# Patient Record
Sex: FEMALE | Race: WHITE | URBAN - METROPOLITAN AREA
[De-identification: names, ages, dates, MRNs, and addresses within clinical notes are randomized per-mention and may not be internally consistent; named-entity substitution may affect disease eponyms.]

---

## 2020-03-16 ENCOUNTER — TELEPHONE (OUTPATIENT)
Dept: CARDIOLOGY CLINIC | Age: 60
End: 2020-03-16

## 2020-03-16 NOTE — TELEPHONE ENCOUNTER
Referral per Dr. Jayant Cervantes for Dr. Emil Gallardo for [address] [facility] with PCI later this week,  + Flu A and on Tamiflu. Patient at [facility] today, will be sent home on Plavix, will complete a full week of Tamiflu (checked with ID). Possible C with PCI later this week, ? Friday. Per Dr. Emil Gallardo, patient will need to be called and checked on patient tomorrow. Check on her angina symptoms. Make sure compliant with Tamiflu. Updated Terrie-  to put on her radar. We discussed and will put on QGenda(done), orders placed and will e-mail her procedure sheet. No insurance information in patient's chart at this time. Will discuss with LAW office to see if they can facilitate--spoke with Nu on the phone. Insurance-- Chris. --Charley HIP (updated Shira 3/17/20)  Iodine allergy____  DC Summary_____

## 2020-03-17 NOTE — TELEPHONE ENCOUNTER
Called and spoke with Dr. Ab Duff, no new update for me since our conversation yesterday. Continue plan for Friday. Instructed him that I will reach back out to patient tomorrow to check in in on her and go over all information for procedure (Iodine Allergy?).

## 2020-03-17 NOTE — TELEPHONE ENCOUNTER
Called patient at [phone] (no HIPPA in chart).  answered and states she is still in the hospital [facility]) with hopes to be discharged today-education currently. States she started treatment with Tamiflu on Friday 3/13/20 per . Gave him my name, number and who I was calling with. Instructed him that I will touch base with the cardiologist to get fully updated and reach back out to them with further instructions. He vu at this time.

## 2020-03-18 NOTE — TELEPHONE ENCOUNTER
LEFT HEART CATHETERIZATION WITH POSSIBLE INTERVENTION WITH DR. HENAO AT [facility]   The morning of your procedure you will park in the hospital parking lot and report directly to the cath lab to check in. Come in via the discharge bridge. DATE: Friday 3/20/20  TIME: 0900  ARRIVAL TIME: 0730    Pre-Procedure Instructions   1. You will need to fast for at least 8 hours prior to procedure. 2. No caffeine the morning of.   3. All other medications can be taken in the morning with sips of water, including your Plavix and aspirin. 4. You will need to take 325 mg aspirin the morning of. If you are currently taking 81 mg please take 4 tablets that morning. 5. Do not use any lotions, creams or perfume the morning of procedure. 6. Pre-procedure lab work will need to be completed at [facility]  7. Please have a responsible adult to drive you home after procedure. We advise you have someone to stay with you for 24 hours following procedure for precautionary measures. Depending on procedure you may require an overnight stay. 8. Cath lab will provide you with all post procedure instructions and follow up. If you have any questions regarding the procedure itself or medications, please call [phone] and ask to speak with a nurse.

## 2020-03-19 NOTE — TELEPHONE ENCOUNTER
Patient's  called stating that he spoke with his insurance and they told him the procedure would NOT be covered. He would like to cancel his procedure and he will call a cardiologist in their network and have them request all needed records.

## 2020-03-19 NOTE — TELEPHONE ENCOUNTER
Called and spoke with Jeanelle Shone, patient's . We reviewed everything on our end and his end. They do match up. He has already called Dr. Nilesh Coronel office at [facility] as he is established in [place]. Waiting for his  to call him back. Instructed him to let us know anything they need within this transition. He vu and has no further question or concerns at this time.

## 2020-03-19 NOTE — PRE-PROCEDURE INSTRUCTIONS
Called patient about procedure in cath lab, instructed to arrive at  0730 for procedure at 0900. Patient should be NPO after midnight, but can take morning medication with sips of water. Instructed to have a responsible adult be with patient to take them home and stay with them afterwards. Patient asked to bring current list of medications. All questions and concerns addressed. In depth conversation with wife and  as they were not certain this was scheduled and still needed confirmation on insurance. I referred them to call their insurance carrier and transferred to [facility] as well. Also provided our phone number to call if they need additional help.

## 2020-03-19 NOTE — TELEPHONE ENCOUNTER
Email received from Yamil Arce that the procedure is a go for tomorrow. Will reach out to patient and .

## 2020-03-20 ENCOUNTER — HOSPITAL ENCOUNTER (OUTPATIENT)
Dept: CARDIAC CATH/INVASIVE PROCEDURES | Age: 60
Discharge: HOME | End: 2020-03-20
Payer: OTHER GOVERNMENT

## 2020-03-25 NOTE — PROGRESS NOTES
she has angina.  COPD (chronic obstructive pulmonary disease) ([facility] [address]     --Managed by Dr. Jake Billings. Using 02.       HLD (hyperlipidemia)     --LDL 78. Taking statin.  Ischemic cardiomyopathy     Echo 3/2020 LVEF 40-45%, grade I DD, mod AI, mild MR. Cath 3/2020 LVEF 55%, LVEDP 12mmHg. proBNP 1560 3/2020.  Tobacco use disorder. Quit smoking. Ongoing cessation discussed. *  GERD. Managed by PCP. Plan  Stable angina. No clinical evidence of CHF. Continue medical management of CAD. Will add Imdur 30mg daily for angina control. Advised to call if she has exertional angina, would then proceed with PCI. Ongoing risk factor modification discussed including tobacco cessation. Continue plavix, ASA, BB and statin. Fasting lipid profile, CMP before next visit. Return in about 3 months (around 7/1/2020). This note was scribed in the presence of the physician by Ji Raymond RN. The scribes documentation has been prepared under my direction and personally reviewed by me in its entirety. I confirm that the note above accurately reflects all work, treatment, procedures, and medical decision making performed by me.

## 2020-04-01 ENCOUNTER — OFFICE VISIT (OUTPATIENT)
Dept: CARDIOLOGY CLINIC | Age: 60
End: 2020-04-01
Payer: OTHER GOVERNMENT

## 2020-04-01 VITALS
BODY MASS INDEX: 20.3 KG/M2 | SYSTOLIC BLOOD PRESSURE: 136 MMHG | TEMPERATURE: 98.1 F | DIASTOLIC BLOOD PRESSURE: 84 MMHG | HEART RATE: 73 BPM | HEIGHT: 63 IN | WEIGHT: 114.6 LBS | OXYGEN SATURATION: 98 %

## 2020-04-01 PROCEDURE — 99215 OFFICE O/P EST HI 40 MIN: CPT | Performed by: INTERNAL MEDICINE

## 2020-04-01 RX ORDER — ISOSORBIDE MONONITRATE 30 MG/1
30 TABLET, EXTENDED RELEASE ORAL DAILY
Qty: 90 TABLET | Refills: 3 | Status: SHIPPED | OUTPATIENT
Start: 2020-04-01

## 2020-04-01 RX ORDER — CLOPIDOGREL BISULFATE 75 MG/1
75 TABLET ORAL DAILY
COMMUNITY
End: 2020-04-15 | Stop reason: SDUPTHER

## 2020-04-01 RX ORDER — OMEPRAZOLE 40 MG/1
40 CAPSULE, DELAYED RELEASE ORAL DAILY
COMMUNITY

## 2020-04-01 RX ORDER — METOPROLOL SUCCINATE 25 MG/1
25 TABLET, EXTENDED RELEASE ORAL DAILY
COMMUNITY
End: 2020-04-15 | Stop reason: SDUPTHER

## 2020-04-01 RX ORDER — BUTALBITAL, ACETAMINOPHEN AND CAFFEINE 300; 40; 50 MG/1; MG/1; MG/1
1 CAPSULE ORAL PRN
COMMUNITY

## 2020-04-01 RX ORDER — ATORVASTATIN CALCIUM 40 MG/1
40 TABLET, FILM COATED ORAL DAILY
COMMUNITY
End: 2020-04-15 | Stop reason: SDUPTHER

## 2020-04-01 RX ORDER — CYCLOBENZAPRINE HCL 10 MG
10 TABLET ORAL PRN
COMMUNITY

## 2020-04-01 RX ORDER — HYDROCODONE BITARTRATE AND ACETAMINOPHEN 5; 325 MG/1; MG/1
1 TABLET ORAL EVERY 6 HOURS PRN
COMMUNITY

## 2020-04-01 ASSESSMENT — ENCOUNTER SYMPTOMS
EYE REDNESS: 0
BLOOD IN STOOL: 0
COUGH: 0
WHEEZING: 0
SHORTNESS OF BREATH: 0
NAUSEA: 0

## 2020-04-01 NOTE — LETTER
[address] [place] [address] Cardiology - [facility] [address]  [address]  Phone: [phone]  Fax: [phone]    Damari Aguirre MD        April 2, 2020     Yaz Guerrier MD  [facility], [address]  [address] [place] [address]    Patient: Ross Hernandez  MR Number: <[id number]>  YOB: 1960  Date of Visit: 4/1/2020    Dear Dr. Yaz Guerrier: Thank you for your referral. Progress note attached in visit summary. If you have questions, please do not hesitate to call me. I look forward to following Monae along with you.     Sincerely,        Damari Aguirre MD

## 2020-04-15 RX ORDER — ATORVASTATIN CALCIUM 40 MG/1
40 TABLET, FILM COATED ORAL DAILY
Qty: 90 TABLET | Refills: 3 | Status: SHIPPED | OUTPATIENT
Start: 2020-04-15

## 2020-04-15 RX ORDER — CLOPIDOGREL BISULFATE 75 MG/1
75 TABLET ORAL DAILY
Qty: 90 TABLET | Refills: 3 | Status: SHIPPED | OUTPATIENT
Start: 2020-04-15

## 2020-04-15 RX ORDER — METOPROLOL SUCCINATE 25 MG/1
25 TABLET, EXTENDED RELEASE ORAL DAILY
Qty: 90 TABLET | Refills: 3 | Status: SHIPPED | OUTPATIENT
Start: 2020-04-15